# Patient Record
Sex: MALE | Race: AMERICAN INDIAN OR ALASKA NATIVE
[De-identification: names, ages, dates, MRNs, and addresses within clinical notes are randomized per-mention and may not be internally consistent; named-entity substitution may affect disease eponyms.]

---

## 2020-09-11 ENCOUNTER — HOSPITAL ENCOUNTER (EMERGENCY)
Dept: HOSPITAL 5 - ED | Age: 20
Discharge: HOME | End: 2020-09-11
Payer: SELF-PAY

## 2020-09-11 VITALS — SYSTOLIC BLOOD PRESSURE: 142 MMHG | DIASTOLIC BLOOD PRESSURE: 94 MMHG

## 2020-09-11 DIAGNOSIS — K02.9: Primary | ICD-10-CM

## 2020-09-11 DIAGNOSIS — K04.7: ICD-10-CM

## 2020-09-11 DIAGNOSIS — K05.00: ICD-10-CM

## 2020-09-11 PROCEDURE — 99282 EMERGENCY DEPT VISIT SF MDM: CPT

## 2020-09-11 NOTE — EMERGENCY DEPARTMENT REPORT
ED General Adult HPI





- General


Chief complaint: Dental/Oral


Stated complaint: TOOTHACHE


Source: patient


Mode of arrival: Ambulatory


Limitations: No Limitations





- History of Present Illness


Initial comments: 





Patient is a 20-year-old -American male with no past medical history who 

presents to the ED with complaint of acute onset persistent severe left 

maxillary premolar molar toothache with swollen gums for the last 2 days.  

Patient states that the pain has been so severe that he is unable to eat 

anything because of pain.  Patient states that he has an appointment with a 

dentist on October 1, 2020 and could hardly wait because of worsening pain.  

Patient denies fever, chills, nausea, vomiting, sore throat, dizziness, syncope,

headache, chest pain, shortness of breath, change in vision, or traumatic 

injury.


MD Complaint: dental pain; gum swelling and pain


-: Sudden, days(s) (2)


Location: mouth


Radiation: non-radiation


Severity scale (0 -10): 9


Quality: aching, sharp


Consistency: constant


Improves with: none


Worsens with: none


Associated Symptoms: denies other symptoms, loss of appetite.  denies: 

confusion, chest pain, cough, diaphoresis, fever/chills, headaches, malaise, 

nausea/vomiting, rash, seizure, shortness of breath, syncope, weakness


Treatments Prior to Arrival: none





- Related Data


                                  Previous Rx's











 Medication  Instructions  Recorded  Last Taken  Type


 


Clindamycin [Clindamycin CAP] 300 mg PO Q8HR #60 capsule 09/11/20 Unknown Rx


 


Ketorolac [Toradol] 10 mg PO Q8H PRN #20 tablet 09/11/20 Unknown Rx


 


traMADoL [Ultram] 50 mg PO Q6HR PRN #12 tablet 09/11/20 Unknown Rx











                                    Allergies











Allergy/AdvReac Type Severity Reaction Status Date / Time


 


No Known Allergies Allergy   Unverified 09/11/20 19:28














ED Review of Systems


ROS: 


Stated complaint: TOOTHACHE


Other details as noted in HPI





Constitutional: denies: chills, fever


Eyes: denies: eye pain, eye discharge, vision change


ENT: dental pain (swollen painful left maxillary gingiva; plus severely painful 

left maxillary premolar and molar teeth).  denies: ear pain, throat pain


Respiratory: denies: cough, shortness of breath, wheezing


Cardiovascular: denies: chest pain, palpitations


Endocrine: no symptoms reported


Gastrointestinal: denies: abdominal pain, nausea, vomiting, diarrhea


Genitourinary: denies: urgency, dysuria


Musculoskeletal: denies: back pain, joint swelling, arthralgia


Skin: denies: rash, lesions


Neurological: denies: headache, weakness, paresthesias


Psychiatric: denies: anxiety, depression


Hematological/Lymphatic: denies: easy bleeding, easy bruising





ED Past Medical Hx





- Past Medical History


Previous Medical History?: No





- Surgical History


Past Surgical History?: No





- Social History


Smoking Status: Never Smoker


Substance Use Type: None





- Medications


Home Medications: 


                                Home Medications











 Medication  Instructions  Recorded  Confirmed  Last Taken  Type


 


Clindamycin [Clindamycin CAP] 300 mg PO Q8HR #60 capsule 09/11/20  Unknown Rx


 


Ketorolac [Toradol] 10 mg PO Q8H PRN #20 tablet 09/11/20  Unknown Rx


 


traMADoL [Ultram] 50 mg PO Q6HR PRN #12 tablet 09/11/20  Unknown Rx














ED Physical Exam





- General


Limitations: No Limitations


General appearance: alert, in no apparent distress





- Head


Head exam: Present: atraumatic, normocephalic, normal inspection





- Eye


Eye exam: Present: normal appearance, PERRL, EOMI


Pupils: Present: normal accommodation





- ENT


ENT exam: Present: mucous membranes moist, TM's normal bilaterally, normal 

external ear exam, other (Swollen severely tender left maxillary gingiva; 

severely tender left maxillary premolar and molar teeth)





- Neck


Neck exam: Present: normal inspection, full ROM.  Absent: tenderness, 

lymphadenopathy





- Respiratory


Respiratory exam: Present: normal lung sounds bilaterally.  Absent: respiratory 

distress, wheezes, rales, chest wall tenderness, decreased breath sounds, 

prolonged expiratory





- Cardiovascular


Cardiovascular Exam: Present: regular rate, normal rhythm, normal heart sounds. 

 Absent: systolic murmur, diastolic murmur, rubs, gallop





- GI/Abdominal


GI/Abdominal exam: Present: soft, normal bowel sounds.  Absent: tenderness, 

guarding, hyperactive bowel sounds, hypoactive bowel sounds





- Extremities Exam


Extremities exam: Present: normal inspection, full ROM, normal capillary refill





- Back Exam


Back exam: Present: normal inspection, full ROM.  Absent: tenderness, CVA 

tenderness (R), CVA tenderness (L), muscle spasm, paraspinal tenderness, 

vertebral tenderness





- Neurological Exam


Neurological exam: Present: alert, oriented X3, CN II-XII intact, normal gait, 

reflexes normal





- Psychiatric


Psychiatric exam: Present: normal affect, normal mood





- Skin


Skin exam: Present: warm, dry, intact, normal color.  Absent: rash





ED Course





                                   Vital Signs











  09/11/20





  19:24


 


Temperature 98.1 F


 


Pulse Rate 68


 


Respiratory 16





Rate 


 


Blood Pressure 142/94


 


O2 Sat by Pulse 98





Oximetry 














ED Medical Decision Making





- Medical Decision Making


This is a 20-year-old -American male with no past medical history who 

presents to the ED with complaint of acute onset persistent severe left 

maxillary premolar molar toothache with swollen gums for the last 2 days.  

Patient states that the pain has been so severe that he is unable to eat 

anything because of pain.  Patient states that he has an appointment with a 

dentist on October 1, 2020 and could hardly wait because of worsening pain.  In 

the ED, patient is alert and oriented x3 and is not in distress.  Patient was 

treated for pain in the ED and also given initial oral antibiotics.  Patient was

 discharged home on pain medications and prophylactic antibiotics and was 

advised to ensure that he follows up with a dentist as previously scheduled.  

Patient was advised to return to the ED immediately if symptoms get worse.





- Differential Diagnosis


Dental abscess; gingivitis; dental caries


Critical care attestation.: 


If time is entered above; I have spent that time in minutes in the direct care 

of this critically ill patient, excluding procedure time.








ED Disposition


Clinical Impression: 


 Dental abscess, Acute gingivitis, Dental caries





Disposition: [id number] TO HOME OR SELFCARE


Is pt being admited?: No


Does the pt Need Aspirin: No


Condition: Stable


Instructions:  Dental Abscess (ED), Dental Caries (ED), Gingivitis (ED)


Additional Instructions: 


Take medication with food, drink plenty of fluids and follow-up with your 

dentist or primary care physician in 7 to 10 days for reevaluation.  Return to 

the ED immediately if symptoms get worse.


Prescriptions: 


Clindamycin [Clindamycin CAP] 300 mg PO Q8HR #60 capsule


Ketorolac [Toradol] 10 mg PO Q8H PRN #20 tablet


 PRN Reason: Pain


traMADoL [Ultram] 50 mg PO Q6HR PRN #12 tablet


 PRN Reason: Pain


Referrals: 


[facility] [Outside] - 3-5 Days


[facility] [Outside] - 3-5 Days


Time of Disposition: 20:49


Print Language: ENGLISH